# Patient Record
Sex: FEMALE | Race: WHITE | NOT HISPANIC OR LATINO | URBAN - NONMETROPOLITAN AREA
[De-identification: names, ages, dates, MRNs, and addresses within clinical notes are randomized per-mention and may not be internally consistent; named-entity substitution may affect disease eponyms.]

---

## 2023-04-01 DIAGNOSIS — Z20.818 PERTUSSIS EXPOSURE: Primary | ICD-10-CM

## 2023-04-01 RX ORDER — AZITHROMYCIN 250 MG/1
TABLET, FILM COATED ORAL
Qty: 6 TABLET | Refills: 0 | Status: SHIPPED | OUTPATIENT
Start: 2023-04-01 | End: 2023-04-06

## 2024-12-23 ENCOUNTER — TELEPHONE (OUTPATIENT)
Dept: PRIMARY CARE | Facility: CLINIC | Age: 34
End: 2024-12-23

## 2024-12-23 NOTE — TELEPHONE ENCOUNTER
My son Garrick is almost 6 weeks old and he is still jaundiced.  I am not too concerned about it because he is eating well and gaining weight.  People have mentioned it to me and they seem concerned.  Someone said it could be NURSING JAUNDICE.  I just would like to hear your opinion.  Please give me a call back.    (Garrick was NOT in the system so I put this message under mom)

## 2024-12-23 NOTE — TELEPHONE ENCOUNTER
LM on her VM -   Garrick is not in our system   You need to make an appt asap with whomever has been seeing him.   That is not normal and he needs to be checked.